# Patient Record
Sex: MALE | Race: WHITE
[De-identification: names, ages, dates, MRNs, and addresses within clinical notes are randomized per-mention and may not be internally consistent; named-entity substitution may affect disease eponyms.]

---

## 2020-01-09 ENCOUNTER — HOSPITAL ENCOUNTER (OUTPATIENT)
Dept: HOSPITAL 57 - BURRAD | Age: 45
Discharge: HOME | End: 2020-01-09
Attending: FAMILY MEDICINE
Payer: COMMERCIAL

## 2020-01-09 DIAGNOSIS — M50.31: ICD-10-CM

## 2020-01-09 DIAGNOSIS — M51.37: ICD-10-CM

## 2020-01-09 DIAGNOSIS — M25.78: ICD-10-CM

## 2020-01-09 DIAGNOSIS — M54.9: Primary | ICD-10-CM

## 2020-01-09 PROCEDURE — 72040 X-RAY EXAM NECK SPINE 2-3 VW: CPT

## 2020-01-09 PROCEDURE — 72070 X-RAY EXAM THORAC SPINE 2VWS: CPT

## 2020-01-09 PROCEDURE — 72100 X-RAY EXAM L-S SPINE 2/3 VWS: CPT

## 2020-01-09 NOTE — RAD
THORACIC SPINE TWO VIEWS: 

1/9/20

 

AP and lateral views show no fracture, disc space narrowing or significant arthritic change. The vert
ebrae were unremarkable for age. 

 

IMPRESSION: 

No significant findings. 

 

POS: HOME

## 2020-01-09 NOTE — RAD
LUMBAR SPINE TWO VIEWS:

1/9/20

 

No fracture was seen. There are no findings suspicious for ankylosing spondylitis. There is disc spac
e narrowing at L5-S1 with a small amount of bony spurring here. The SI joints appear normal.  

 

IMPRESSION: 

L5-S1 disc space narrowing and osteophytes. 

 

POS: HOME

## 2020-01-09 NOTE — RAD
CERVICAL SPINE:

1/9/20

 

AP, lateral, open mouth and Swimmer's views were provided. No fracture or dislocation was seen. There
 is loss of the normal cervical lordosis which could be due to muscle spasm. Slight disc space narrow
ing is present at C3-C4. The C1 to dens distance is normal and the soft tissues are normal in thickne
ss. 

 

IMPRESSION: 

1.      Straightening of the cervical spine, possibly due to spasm. 

2.      Mild disc space narrowing at C3-C4. 

 

POS: HOME

## 2021-10-16 ENCOUNTER — HOSPITAL ENCOUNTER (EMERGENCY)
Dept: HOSPITAL 92 - ERS | Age: 46
Discharge: HOME | End: 2021-10-16
Payer: COMMERCIAL

## 2021-10-16 DIAGNOSIS — S00.83XA: ICD-10-CM

## 2021-10-16 DIAGNOSIS — S12.601A: ICD-10-CM

## 2021-10-16 DIAGNOSIS — Z23: ICD-10-CM

## 2021-10-16 DIAGNOSIS — S80.212A: ICD-10-CM

## 2021-10-16 DIAGNOSIS — S12.501A: Primary | ICD-10-CM

## 2021-10-16 DIAGNOSIS — S80.211A: ICD-10-CM

## 2021-10-16 DIAGNOSIS — S60.511A: ICD-10-CM

## 2021-10-16 DIAGNOSIS — I10: ICD-10-CM
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
## 2021-10-16 DIAGNOSIS — S60.512A: ICD-10-CM

## 2021-10-16 LAB
ALBUMIN SERPL BCG-MCNC: 4.1 G/DL (ref 3.5–5)
ALP SERPL-CCNC: 62 U/L (ref 40–110)
ALT SERPL W P-5'-P-CCNC: 37 U/L (ref 8–55)
ANION GAP SERPL CALC-SCNC: 11 MMOL/L (ref 10–20)
APTT PPP: 30.2 SEC (ref 22.9–36.1)
AST SERPL-CCNC: 30 U/L (ref 5–34)
BASOPHILS # BLD AUTO: 0 THOU/UL (ref 0–0.2)
BASOPHILS NFR BLD AUTO: 0.2 % (ref 0–1)
BILIRUB SERPL-MCNC: 0.8 MG/DL (ref 0.2–1.2)
BUN SERPL-MCNC: 13 MG/DL (ref 8.9–20.6)
CALCIUM SERPL-MCNC: 9.4 MG/DL (ref 7.8–10.44)
CHLORIDE SERPL-SCNC: 103 MMOL/L (ref 98–107)
CK SERPL-CCNC: 442 U/L (ref 30–200)
CO2 SERPL-SCNC: 26 MMOL/L (ref 22–29)
CREAT CL PREDICTED SERPL C-G-VRATE: 0 ML/MIN (ref 70–130)
EOSINOPHIL # BLD AUTO: 0.1 THOU/UL (ref 0–0.7)
EOSINOPHIL NFR BLD AUTO: 0.7 % (ref 0–10)
GLOBULIN SER CALC-MCNC: 2.7 G/DL (ref 2.4–3.5)
GLUCOSE SERPL-MCNC: 143 MG/DL (ref 70–105)
HGB BLD-MCNC: 16.3 G/DL (ref 14–18)
INR PPP: 1
LYMPHOCYTES # BLD: 2.3 THOU/UL (ref 1.2–3.4)
LYMPHOCYTES NFR BLD AUTO: 12.5 % (ref 21–51)
MCH RBC QN AUTO: 31.5 PG (ref 27–31)
MCV RBC AUTO: 90.5 FL (ref 78–98)
MONOCYTES # BLD AUTO: 1.1 THOU/UL (ref 0.11–0.59)
MONOCYTES NFR BLD AUTO: 6 % (ref 0–10)
NEUTROPHILS # BLD AUTO: 14.9 THOU/UL (ref 1.4–6.5)
NEUTROPHILS NFR BLD AUTO: 80.6 % (ref 42–75)
PLATELET # BLD AUTO: 360 THOU/UL (ref 130–400)
POTASSIUM SERPL-SCNC: 3.8 MMOL/L (ref 3.5–5.1)
PROTHROMBIN TIME: 13.5 SEC (ref 12–14.7)
RBC # BLD AUTO: 5.17 MILL/UL (ref 4.7–6.1)
SODIUM SERPL-SCNC: 136 MMOL/L (ref 136–145)
WBC # BLD AUTO: 18.4 THOU/UL (ref 4.8–10.8)

## 2021-10-16 PROCEDURE — 70450 CT HEAD/BRAIN W/O DYE: CPT

## 2021-10-16 PROCEDURE — G0390 TRAUMA RESPONS W/HOSP CRITI: HCPCS

## 2021-10-16 PROCEDURE — 86900 BLOOD TYPING SEROLOGIC ABO: CPT

## 2021-10-16 PROCEDURE — 85730 THROMBOPLASTIN TIME PARTIAL: CPT

## 2021-10-16 PROCEDURE — 71045 X-RAY EXAM CHEST 1 VIEW: CPT

## 2021-10-16 PROCEDURE — 72170 X-RAY EXAM OF PELVIS: CPT

## 2021-10-16 PROCEDURE — 90715 TDAP VACCINE 7 YRS/> IM: CPT

## 2021-10-16 PROCEDURE — 72125 CT NECK SPINE W/O DYE: CPT

## 2021-10-16 PROCEDURE — 96374 THER/PROPH/DIAG INJ IV PUSH: CPT

## 2021-10-16 PROCEDURE — 74177 CT ABD & PELVIS W/CONTRAST: CPT

## 2021-10-16 PROCEDURE — 93005 ELECTROCARDIOGRAM TRACING: CPT

## 2021-10-16 PROCEDURE — 94760 N-INVAS EAR/PLS OXIMETRY 1: CPT

## 2021-10-16 PROCEDURE — 83605 ASSAY OF LACTIC ACID: CPT

## 2021-10-16 PROCEDURE — 84484 ASSAY OF TROPONIN QUANT: CPT

## 2021-10-16 PROCEDURE — 85610 PROTHROMBIN TIME: CPT

## 2021-10-16 PROCEDURE — 36415 COLL VENOUS BLD VENIPUNCTURE: CPT

## 2021-10-16 PROCEDURE — 90471 IMMUNIZATION ADMIN: CPT

## 2021-10-16 PROCEDURE — 80053 COMPREHEN METABOLIC PANEL: CPT

## 2021-10-16 PROCEDURE — 85025 COMPLETE CBC W/AUTO DIFF WBC: CPT

## 2021-10-16 PROCEDURE — 86850 RBC ANTIBODY SCREEN: CPT

## 2021-10-16 PROCEDURE — 71260 CT THORAX DX C+: CPT

## 2021-10-16 PROCEDURE — 86901 BLOOD TYPING SEROLOGIC RH(D): CPT

## 2021-10-16 PROCEDURE — 82550 ASSAY OF CK (CPK): CPT
